# Patient Record
Sex: MALE | URBAN - NONMETROPOLITAN AREA
[De-identification: names, ages, dates, MRNs, and addresses within clinical notes are randomized per-mention and may not be internally consistent; named-entity substitution may affect disease eponyms.]

---

## 2018-03-06 ENCOUNTER — APPOINTMENT (OUTPATIENT)
Age: 58
Setting detail: DERMATOLOGY
End: 2018-03-06

## 2018-03-06 DIAGNOSIS — L71.8 OTHER ROSACEA: ICD-10-CM

## 2018-03-06 DIAGNOSIS — L60.3 NAIL DYSTROPHY: ICD-10-CM

## 2018-03-06 PROCEDURE — 99202 OFFICE O/P NEW SF 15 MIN: CPT

## 2018-03-06 PROCEDURE — OTHER MIPS QUALITY: OTHER

## 2018-03-06 PROCEDURE — OTHER COUNSELING: OTHER

## 2018-03-06 PROCEDURE — OTHER NAIL CLIPPING FOR PAS: OTHER

## 2018-03-06 PROCEDURE — OTHER ORDER TESTS: OTHER

## 2018-03-06 ASSESSMENT — LOCATION ZONE DERM
LOCATION ZONE: TOENAIL
LOCATION ZONE: FACE

## 2018-03-06 ASSESSMENT — LOCATION DETAILED DESCRIPTION DERM
LOCATION DETAILED: RIGHT INFERIOR CENTRAL MALAR CHEEK
LOCATION DETAILED: RIGHT 5TH TOENAIL
LOCATION DETAILED: LEFT CENTRAL MALAR CHEEK
LOCATION DETAILED: LEFT 5TH TOENAIL

## 2018-03-06 ASSESSMENT — LOCATION SIMPLE DESCRIPTION DERM
LOCATION SIMPLE: RIGHT 5TH TOE
LOCATION SIMPLE: RIGHT CHEEK
LOCATION SIMPLE: LEFT 5TH TOE
LOCATION SIMPLE: LEFT CHEEK

## 2018-04-06 ENCOUNTER — APPOINTMENT (OUTPATIENT)
Age: 58
Setting detail: DERMATOLOGY
End: 2018-04-06

## 2018-04-06 DIAGNOSIS — B35.1 TINEA UNGUIUM: ICD-10-CM

## 2018-04-06 DIAGNOSIS — Z48.02 ENCOUNTER FOR REMOVAL OF SUTURES: ICD-10-CM

## 2018-04-06 PROCEDURE — 99024 POSTOP FOLLOW-UP VISIT: CPT

## 2018-04-06 PROCEDURE — OTHER MIPS QUALITY: OTHER

## 2018-04-06 PROCEDURE — OTHER ADDITIONAL NOTES: OTHER

## 2018-04-06 PROCEDURE — OTHER PRESCRIPTION: OTHER

## 2018-04-06 PROCEDURE — OTHER COUNSELING: OTHER

## 2018-04-06 PROCEDURE — OTHER POST-OP WOUND EVALUATION: OTHER

## 2018-04-06 RX ORDER — TERBINAFINE HCL 250 MG
250MG TABLET ORAL QD
Qty: 30 | Refills: 0 | Status: ERX | COMMUNITY
Start: 2018-04-06

## 2018-04-06 ASSESSMENT — LOCATION DETAILED DESCRIPTION DERM: LOCATION DETAILED: RIGHT 5TH TOENAIL

## 2018-04-06 ASSESSMENT — LOCATION ZONE DERM: LOCATION ZONE: TOENAIL

## 2018-04-06 ASSESSMENT — LOCATION SIMPLE DESCRIPTION DERM: LOCATION SIMPLE: RIGHT 5TH TOE

## 2018-04-06 NOTE — PROCEDURE: ADDITIONAL NOTES
Additional Notes: Patient was given an order to have labs drawn for CMP, PT,PTT,INR if within normal limits patient will begin terbinafine 250mg QD. This monitoring is due to patient his of Hep C.

## 2018-04-06 NOTE — PROCEDURE: POST-OP WOUND EVALUATION
Wound Diameter In Cm(Optional): 0
Follow Up Units (Optional): -
Detail Level: Detailed
Wound Crusting?: clean
Add 42826 Cpt? (Important Note: In 2017 The Use Of 70705 Is Being Tracked By Cms To Determine Future Global Period Reimbursement For Global Periods): no

## 2018-04-27 ENCOUNTER — APPOINTMENT (OUTPATIENT)
Age: 58
Setting detail: DERMATOLOGY
End: 2018-04-27

## 2018-04-27 DIAGNOSIS — B35.1 TINEA UNGUIUM: ICD-10-CM

## 2018-04-27 PROCEDURE — OTHER ORDER TESTS: OTHER

## 2018-04-27 PROCEDURE — OTHER ADDITIONAL NOTES: OTHER

## 2018-04-27 PROCEDURE — 99213 OFFICE O/P EST LOW 20 MIN: CPT

## 2018-04-27 PROCEDURE — OTHER PRESCRIPTION: OTHER

## 2018-04-27 PROCEDURE — OTHER COUNSELING: OTHER

## 2018-04-27 RX ORDER — TERBINAFINE HCL 250 MG
250MG TABLET ORAL QD
Qty: 30 | Refills: 0 | Status: ERX

## 2018-04-27 ASSESSMENT — LOCATION DETAILED DESCRIPTION DERM: LOCATION DETAILED: RIGHT 5TH TOENAIL

## 2018-04-27 ASSESSMENT — LOCATION SIMPLE DESCRIPTION DERM: LOCATION SIMPLE: RIGHT 5TH TOE

## 2018-04-27 ASSESSMENT — LOCATION ZONE DERM: LOCATION ZONE: TOENAIL

## 2018-05-08 ENCOUNTER — APPOINTMENT (OUTPATIENT)
Age: 58
Setting detail: DERMATOLOGY
End: 2018-05-18

## 2018-05-18 ENCOUNTER — RX ONLY (RX ONLY)
Age: 58
End: 2018-05-18

## 2018-05-18 RX ORDER — TERBINAFINE HCL 250 MG
250MG TABLET ORAL QD
Qty: 30 | Refills: 0

## 2018-06-19 ENCOUNTER — RX ONLY (RX ONLY)
Age: 58
End: 2018-06-19

## 2018-06-19 ENCOUNTER — APPOINTMENT (OUTPATIENT)
Age: 58
Setting detail: DERMATOLOGY
End: 2018-06-19

## 2018-06-19 DIAGNOSIS — B35.1 TINEA UNGUIUM: ICD-10-CM

## 2018-06-19 PROCEDURE — 99213 OFFICE O/P EST LOW 20 MIN: CPT

## 2018-06-19 PROCEDURE — OTHER PRESCRIPTION: OTHER

## 2018-06-19 PROCEDURE — OTHER COUNSELING: OTHER

## 2018-06-19 RX ORDER — TERBINAFINE HCL 250 MG
250MG TABLET ORAL QD
Qty: 30 | Refills: 0

## 2018-06-19 ASSESSMENT — LOCATION ZONE DERM: LOCATION ZONE: TOENAIL

## 2018-06-19 ASSESSMENT — LOCATION DETAILED DESCRIPTION DERM: LOCATION DETAILED: RIGHT 5TH TOENAIL

## 2018-06-19 ASSESSMENT — LOCATION SIMPLE DESCRIPTION DERM: LOCATION SIMPLE: RIGHT 5TH TOE

## 2018-07-17 ENCOUNTER — APPOINTMENT (OUTPATIENT)
Age: 58
Setting detail: DERMATOLOGY
End: 2018-07-17

## 2018-07-17 DIAGNOSIS — B35.1 TINEA UNGUIUM: ICD-10-CM

## 2018-07-17 PROCEDURE — OTHER COUNSELING: OTHER

## 2018-07-17 PROCEDURE — 99214 OFFICE O/P EST MOD 30 MIN: CPT

## 2018-07-17 ASSESSMENT — LOCATION DETAILED DESCRIPTION DERM
LOCATION DETAILED: LEFT GREAT TOENAIL
LOCATION DETAILED: RIGHT 5TH TOENAIL
LOCATION DETAILED: RIGHT 4TH TOENAIL
LOCATION DETAILED: RIGHT 2ND TOENAIL
LOCATION DETAILED: RIGHT GREAT TOENAIL
LOCATION DETAILED: LEFT 5TH TOENAIL
LOCATION DETAILED: LEFT 2ND TOENAIL
LOCATION DETAILED: RIGHT 3RD TOENAIL
LOCATION DETAILED: LEFT 3RD TOENAIL
LOCATION DETAILED: LEFT 4TH TOENAIL

## 2018-07-17 ASSESSMENT — LOCATION SIMPLE DESCRIPTION DERM
LOCATION SIMPLE: LEFT 5TH TOE
LOCATION SIMPLE: RIGHT 2ND TOE
LOCATION SIMPLE: LEFT GREAT TOE
LOCATION SIMPLE: RIGHT 3RD TOE
LOCATION SIMPLE: RIGHT GREAT TOE
LOCATION SIMPLE: RIGHT 5TH TOE
LOCATION SIMPLE: LEFT 2ND TOE
LOCATION SIMPLE: LEFT 3RD TOE
LOCATION SIMPLE: LEFT 4TH TOE
LOCATION SIMPLE: RIGHT 4TH TOE

## 2018-07-17 ASSESSMENT — LOCATION ZONE DERM: LOCATION ZONE: TOENAIL
